# Patient Record
(demographics unavailable — no encounter records)

---

## 2024-10-10 NOTE — HISTORY OF PRESENT ILLNESS
[Patient reported bone density results were normal] : Patient reported bone density results were normal [postmenopausal] : postmenopausal [N] : Patient is not sexually active [Y] : Positive pregnancy history [Mammogramdate] : 11/2023 [TextBox_19] : br2 [BoneDensityDate] : 2023 [ColonoscopyDate] : 2023 [LMPDate] :  [PGxTotal] : 1 [[facility]] : 1 [PGxPremature] : 1 [[facility]] : [facility] [Menarche Age: ____] : age at menarche was [unfilled] [FreeTextEntry1] :  [No] : Patient does not have concerns regarding sex [Previously active] : previously active

## 2024-10-10 NOTE — PHYSICAL EXAM
[Chaperone Present] : A chaperone was present in the examining room during all aspects of the physical examination [FreeTextEntry2] : Esperanza Hardy MA

## 2024-10-23 NOTE — CHIEF COMPLAINT
[FreeTextEntry1] : [facility] [place] [address] [place] [address]

## 2024-10-23 NOTE — PHYSICAL EXAM
[Chaperone Present] : A chaperone was present in the examining room during all aspects of the physical examination [28192] : A chaperone was present during the pelvic exam. [FreeTextEntry2] : JAIR Sheehan [Normal] : Bimanual Exam: Normal [de-identified] : enlarged uterus, possible adenomyosis

## 2024-10-23 NOTE — PLAN
[TextEntry] : CHANO CHRISTINE BSO SLND cysto @ /[facility]/[place] (not [place]) Consent signed in office Acknowledgement of hysterectomy signed Slide release signed Bowel prep/ERP/NPO after midnight discussed  Medical clearance and PST with CBC, CMP, HgA1c, PTT/PT/INR, UA, T&S

## 2024-10-23 NOTE — HISTORY OF PRESENT ILLNESS
[FreeTextEntry1] : 72 yo  via C/S LMP  referred by Dr Altman for endometrial cancer.  Patient presented 24 with c/o PMB which started 2 weeks prior. EMB was performed whch resulted as benign squamous epithelium, mucus and neutrophils but was a scant sample.  US 10/10/24 with AV uterus, 6/5 x 3.47 x 2.87 cm, endometrium heterogeneous and thickened to 5.09 mm, with intracavitary fluid. Possible cervical polyp seen measuring 1.08 x 0.45 x 0.88 cm. and decision to procedure with repeat biopsy was made.  Repeat EMB  10/10/24 with endometrioid adenocarcinoma, FIGO grade 1, small, detached fragments. P53 wild type, P16 patchy positive, ER diffusely positive. MMR intact.   HM:  Pap Smear:  WNL as per pt Mammo: Dec 2023, WNL Bone Denisity: , WNL Colonoscopy: , no polyps found, pt has had diverticulitis   Has had all vaccines, no longer takes the covid booster

## 2024-10-23 NOTE — DISCUSSION/SUMMARY
[FreeTextEntry1] : Managment of endometrial cancer was discussed including RA TLH BSO SLND cystoscopy. I advised patient she may need a larger abdominal incision to remove uterus intact as it may not be able to be extracted vaginally. I discussed at length with the patient the nature, purpose, risks, benefits, and alternatives of Robot assisted total laparoscopic hysterectomy with bilateral salpingo-oophorectomy,sentinel lymph node mapping. The patient understands the risks to include, but not be limited to, bowel injury, bleeding (with the possible need for transfusion), bladder or ureteral injury, infections, deep venous thrombosis, and jaja-operative death. The patient also understands that her surgery may not be able to be performed robotically and that she may need a laparotomy.  She also understands the limitations of robotic surgery and the possibility of missing a surgical complication with need for subsequent re-exploration.  She agrees to proceed.  She asked numerous questions which were answered to her satisfaction.  She understands the need for a pre-operative bowel preparation and agrees to comply with our instructions.  She also understands the rationale for a cystoscopy at the completion of the procedure and the potential risks of cystoscopy.

## 2024-10-23 NOTE — END OF VISIT
[FreeTextEntry3] : I, Dr. Rufina French, personally performed the evaluation and management of (E/M) services for this new patient. That E/M includes conducting the initial examination, assessing all conditions, and establishing the plan of care. Today, Lore Patterson PA-C, was here to observe my evaluation and management services for this patient to be followed going forward.

## 2024-11-18 NOTE — END OF VISIT
[FreeTextEntry2] : Samara Alvarez MA was present the entire duration of the patient interaction and gynecological exam. [FreeTextEntry3] : RTC in 4 weeks for cuff check

## 2024-11-18 NOTE — END OF VISIT
[FreeTextEntry3] : RTC in 4 weeks for cuff check [FreeTextEntry2] : Samara Alvarez MA was present the entire duration of the patient interaction and gynecological exam.

## 2024-11-18 NOTE — ASSESSMENT
[FreeTextEntry1] : Pt is a 72 yo with Stage Ia endometrioid adenocarcnoma, grade 1, <50% invasion (1/7 mm), no LVI, lower uterine segment involvement, but no cervical involvement, negative sentinel lymph nodes s/p TRH, BSO, SLND, cysto on 11/4/24. Recovering well.

## 2024-11-18 NOTE — REASON FOR VISIT
[Post Op] : post op visit [de-identified] : 11/4/24 [de-identified] : RA TLH BSO SLND, Uterosacral ligament fixation, cystoscopy  [de-identified] : Patient has recovered well from her surgery, Denies any SOB, abnormal pain or VB. Bowel and bladder function are wnl. Patient states she feels well from a gynecological stand point.

## 2024-11-18 NOTE — ASSESSMENT
[FreeTextEntry1] : Pt is a 74 yo with Stage Ia endometrioid adenocarcnoma, grade 1, <50% invasion (1/7 mm), no LVI, lower uterine segment involvement, but no cervical involvement, negative sentinel lymph nodes s/p TRH, BSO, SLND, cysto on 11/4/24. Recovering well.

## 2024-11-18 NOTE — DISCUSSION/SUMMARY
[Clean] : was clean [Dry] : was dry [Intact] : was intact [Erythema] : was not erythematous [Ecchymosis] : was not ecchymotic [Seroma] : had no seroma [None] : had no drainage [Normal Skin] : normal appearance [Firm] : soft [Tender] : nontender [Abnormal Bowel Sounds] : normal bowel sounds [Rebound] : no rebound tenderness [Guarding] : no guarding [Incisional Hernia] : no incisional hernia [Mass] : no palpable mass [Doing Well] : is doing well [Excellent Pain Control] : has excellent pain control [No Sign of Infection] : is showing no signs of infection [FreeTextEntry1] : Pertinent Findings: Uterus sounded to 6 cm, normal bilateral fallopian tubes and ovaries. Bilateral obturator sentinel lymph nodes. No para-aortic sentinel lymph nodes. No concern for metastatic disease or abnormal lymph nodes. Cystoscopy: normal bilateral ureteral jets, no injury to the bladder and  no lesions.   Final Diagnosis 1.  Uterus, cervix, bilateral fallopian tubes and ovaries; total hysterectomy with bilateral salpingo-oophorectomy: -   Endometrioid adenocarcinoma, FIGO grade 1, see synoptic report -   Minimal superficial invasion,  less than 50% -   Leiomyoma -   Cervix with nabothian cyst -   Tumor involves lower uterine segment -   Cervical stroma is not involved by the tumor -   Bilateral parametrium, unremarkable -   Bilateral fallopian tubes with paratubal cyst -   Bilateral ovaries with inclusion cyst   2.  Left obturator sentinel lymph node, excision: -1 lymph node negative for metastatic carcinoma on H and E exam and AE1/AE3 immunostain (0/1)  3.  Right obturator sentinel lymph node, excision: -   1 lymph node negative for metastatic carcinoma on H and E exam and AE1/AE3 immunostain (0/1)  4.  Epiploic biopsy: -   Benign fibromembranous tissue with focal fibrosis, calcification, chronic inflammatory infiltrate  5.  Left uterosacral biopsy: -   Fibrotic nodule with calcification

## 2024-11-18 NOTE — REASON FOR VISIT
[Post Op] : post op visit [de-identified] : 11/4/24 [de-identified] : RA TLH BSO SLND, Uterosacral ligament fixation, cystoscopy  [de-identified] : Patient has recovered well from her surgery, Denies any SOB, abnormal pain or VB. Bowel and bladder function are wnl. Patient states she feels well from a gynecological stand point.

## 2024-12-09 NOTE — REASON FOR VISIT
[Post Op] : post op visit [Other ___] : [unfilled] [de-identified] : 11/4/24 [de-identified] :  RA TLH BSO SLND, Uterosacral ligament fixation, cystoscopy [de-identified] : Pt presents for second post-op visit. Reports recovering well, no vaginal bleeding or discharge, no pain, no fevers/chils. Depressed mood, less energy, no suicidal ideation.

## 2024-12-09 NOTE — ASSESSMENT
[FreeTextEntry1] : Pt is a 72 yo with Stage Ia endometrioid adenocarcnoma, grade 1, <50% invasion (1/7 mm), no LVI, lower uterine segment involvement, but no cervical involvement, negative sentinel lymph nodes s/p TRH, BSO, SLND, cysto on 11/4/24. Vaginal cuff well healed. Start surveillance in 4-6 months.   She feels she may be depressed and not usual for her. Referal for therapy provided.

## 2024-12-09 NOTE — DISCUSSION/SUMMARY
[Clean] : was clean [Dry] : was dry [Intact] : was intact [None] : had no drainage [Normal Skin] : normal appearance [Doing Well] : is doing well [Excellent Pain Control] : has excellent pain control [No Sign of Infection] : is showing no signs of infection [Erythema] : was not erythematous [Ecchymosis] : was not ecchymotic [Seroma] : had no seroma [Firm] : soft [Tender] : nontender [Abnormal Bowel Sounds] : normal bowel sounds [Rebound] : no rebound tenderness [Guarding] : no guarding [Incisional Hernia] : no incisional hernia [Mass] : no palpable mass [External Genitalia Abnormal] : normal external genitalia [Vaginal Exam Abnormal] : normal vaginal exam [de-identified] : cuff well healed [FreeTextEntry1] : Pertinent Findings: Uterus sounded to 6 cm, normal bilateral fallopian tubes and ovaries. Bilateral obturator sentinel lymph nodes. No para-aortic sentinel lymph nodes. No concern for metastatic disease or abnormal lymph nodes. Cystoscopy: normal bilateral ureteral jets, no injury to the bladder and no lesions.  Final Diagnosis 1. Uterus, cervix, bilateral fallopian tubes and ovaries; total hysterectomy with bilateral salpingo-oophorectomy: - Endometrioid adenocarcinoma, FIGO grade 1, see synoptic report - Minimal superficial invasion, less than 50% - Leiomyoma - Cervix with nabothian cyst - Tumor involves lower uterine segment - Cervical stroma is not involved by the tumor - Bilateral parametrium, unremarkable - Bilateral fallopian tubes with paratubal cyst - Bilateral ovaries with inclusion cyst   2. Left obturator sentinel lymph node, excision: -1 lymph node negative for metastatic carcinoma on H and E exam and AE1/AE3 immunostain (0/1)  3. Right obturator sentinel lymph node, excision: - 1 lymph node negative for metastatic carcinoma on H and E exam and AE1/AE3 immunostain (0/1)  4. Epiploic biopsy: - Benign fibromembranous tissue with focal fibrosis, calcification, chronic inflammatory infiltrate  5. Left uterosacral biopsy: - Fibrotic nodule with calcification.

## 2024-12-09 NOTE — END OF VISIT
[FreeTextEntry3] : RTC in 4-6 months  [FreeTextEntry2] : July Snow MA was present the entire duration of the patient interaction and gynecological exam.

## 2024-12-09 NOTE — DISCUSSION/SUMMARY
[Clean] : was clean [Dry] : was dry [Intact] : was intact [None] : had no drainage [Normal Skin] : normal appearance [Doing Well] : is doing well [Excellent Pain Control] : has excellent pain control [No Sign of Infection] : is showing no signs of infection [Erythema] : was not erythematous [Ecchymosis] : was not ecchymotic [Seroma] : had no seroma [Firm] : soft [Tender] : nontender [Abnormal Bowel Sounds] : normal bowel sounds [Rebound] : no rebound tenderness [Guarding] : no guarding [Incisional Hernia] : no incisional hernia [Mass] : no palpable mass [External Genitalia Abnormal] : normal external genitalia [Vaginal Exam Abnormal] : normal vaginal exam [de-identified] : cuff well healed [FreeTextEntry1] : Pertinent Findings: Uterus sounded to 6 cm, normal bilateral fallopian tubes and ovaries. Bilateral obturator sentinel lymph nodes. No para-aortic sentinel lymph nodes. No concern for metastatic disease or abnormal lymph nodes. Cystoscopy: normal bilateral ureteral jets, no injury to the bladder and no lesions.  Final Diagnosis 1. Uterus, cervix, bilateral fallopian tubes and ovaries; total hysterectomy with bilateral salpingo-oophorectomy: - Endometrioid adenocarcinoma, FIGO grade 1, see synoptic report - Minimal superficial invasion, less than 50% - Leiomyoma - Cervix with nabothian cyst - Tumor involves lower uterine segment - Cervical stroma is not involved by the tumor - Bilateral parametrium, unremarkable - Bilateral fallopian tubes with paratubal cyst - Bilateral ovaries with inclusion cyst   2. Left obturator sentinel lymph node, excision: -1 lymph node negative for metastatic carcinoma on H and E exam and AE1/AE3 immunostain (0/1)  3. Right obturator sentinel lymph node, excision: - 1 lymph node negative for metastatic carcinoma on H and E exam and AE1/AE3 immunostain (0/1)  4. Epiploic biopsy: - Benign fibromembranous tissue with focal fibrosis, calcification, chronic inflammatory infiltrate  5. Left uterosacral biopsy: - Fibrotic nodule with calcification.

## 2024-12-09 NOTE — ASSESSMENT
[FreeTextEntry1] : Pt is a 74 yo with Stage Ia endometrioid adenocarcnoma, grade 1, <50% invasion (1/7 mm), no LVI, lower uterine segment involvement, but no cervical involvement, negative sentinel lymph nodes s/p TRH, BSO, SLND, cysto on 11/4/24. Vaginal cuff well healed. Start surveillance in 4-6 months.   She feels she may be depressed and not usual for her. Referal for therapy provided.

## 2024-12-09 NOTE — REASON FOR VISIT
[Post Op] : post op visit [Other ___] : [unfilled] [de-identified] : 11/4/24 [de-identified] :  RA TLH BSO SLND, Uterosacral ligament fixation, cystoscopy [de-identified] : Pt presents for second post-op visit. Reports recovering well, no vaginal bleeding or discharge, no pain, no fevers/chils. Depressed mood, less energy, no suicidal ideation.

## 2025-01-07 NOTE — ASSESSMENT
[FreeTextEntry1] : 72yo female status post RA TLH BSO SLND, Uterosacral ligament fixation, cystoscopy on 11/4/24 for Stage IA endometrioid adenocarcinoma, grade 1, <50% invasion (1/7 mm), no LVI, lower uterine segment involvement, but no cervical involvement, negative sentinel lymph nodes with no adjuvant therapy.   Pt presents today with intermittent vaginal pains and persistent vaginal spotting. On exam, vaginal cuff appears intact, however, limited exam due to body habitus and discomfort. Vaginal cuff tender on left side only with speculum. No pain with manual exam. No signs of bleeding or mass. Will order CT abd/pelvis STAT for further evaluation.   Pt is traveling to [place] tomorrow late am. Will call pt with results.

## 2025-01-07 NOTE — REASON FOR VISIT
[FreeTextEntry1] : [facility] [phone] at [address] [place] [address]

## 2025-01-07 NOTE — PHYSICAL EXAM
[Chaperone Present] : A chaperone was present in the examining room during all aspects of the physical examination [68593] : A chaperone was present during the pelvic exam. [Absent] : Adnexa(ae): Absent [Normal] : Bimanual Exam: Normal [Fully active, able to carry on all pre-disease performance without restriction] : Status 0 - Fully active, able to carry on all pre-disease performance without restriction [FreeTextEntry2] :  Samara Colon MA [de-identified] : limited exam due to patient discomfort, tender vaginal cuff on left side only with speculum-not with manual exam. No blood noted.

## 2025-01-07 NOTE — HISTORY OF PRESENT ILLNESS
[FreeTextEntry1] : 74yo female status post RA TLH BSO SLND, Uterosacral ligament fixation, cystoscopy on 11/4/24 for Stage IA endometrioid adenocarcinoma, grade 1, <50% invasion (1/7 mm), no LVI, lower uterine segment involvement, but no cervical involvement, negative sentinel lymph nodes with no adjuvant therapy.   Pt presents today with intermittent vaginal pains and persistent vaginal spotting. States she had intercourse for the first time at her 6 week melisa and felt immediate pain. She noticed light spotting approx 3-4 days after and since then has intermittent staining and vaginal pains worse during the day and improved at rest. Denies pelvic/abdominal pains, n/v or fever.   Pt is traveling to [place] tomorrow late am and wants to make sure everything is okay.

## 2025-01-07 NOTE — REASON FOR VISIT
[FreeTextEntry1] : [facility] [phone] at [address] [place] [address]

## 2025-01-07 NOTE — ASSESSMENT
[FreeTextEntry1] : 74yo female status post RA TLH BSO SLND, Uterosacral ligament fixation, cystoscopy on 11/4/24 for Stage IA endometrioid adenocarcinoma, grade 1, <50% invasion (1/7 mm), no LVI, lower uterine segment involvement, but no cervical involvement, negative sentinel lymph nodes with no adjuvant therapy.   Pt presents today with intermittent vaginal pains and persistent vaginal spotting. On exam, vaginal cuff appears intact, however, limited exam due to body habitus and discomfort. Vaginal cuff tender on left side only with speculum. No pain with manual exam. No signs of bleeding or mass. Will order CT abd/pelvis STAT for further evaluation.   Pt is traveling to [place] tomorrow late am. Will call pt with results.

## 2025-01-07 NOTE — PHYSICAL EXAM
[Chaperone Present] : A chaperone was present in the examining room during all aspects of the physical examination [[id number]] : A chaperone was present during the pelvic exam. [Absent] : Adnexa(ae): Absent [Normal] : Bimanual Exam: Normal [Fully active, able to carry on all pre-disease performance without restriction] : Status 0 - Fully active, able to carry on all pre-disease performance without restriction [FreeTextEntry2] :  Samara Colon MA [de-identified] : limited exam due to patient discomfort, tender vaginal cuff on left side only with speculum-not with manual exam. No blood noted.

## 2025-01-07 NOTE — PLAN
[TextEntry] : CT abdomen/pelvis w/ IV contrast STAT-r/o pelvic abscess Await vaginal culture   ADDENDUM: Spoke to radiologist and reviewed CT. Spoke to pt and discussed findings of non- specific cystic structure about 2cm along the left external iliac vessels likely lymphocele or seroma. No suspicion for abscess. Cuff intact as per radiologist.   Recommended abstaining from intercourse until her spotting and pain resolves. Rec RTO when she returns from her trip for reevaluation. Pt was very appreciative.

## 2025-01-07 NOTE — REVIEW OF SYSTEMS
[Negative] : Musculoskeletal [Abn Vag Bleeding] : abnormal vaginal bleeding [FreeTextEntry4] : vaginal pain & staining

## 2025-01-07 NOTE — HISTORY OF PRESENT ILLNESS
[FreeTextEntry1] : 72yo female status post RA TLH BSO SLND, Uterosacral ligament fixation, cystoscopy on 11/4/24 for Stage IA endometrioid adenocarcinoma, grade 1, <50% invasion (1/7 mm), no LVI, lower uterine segment involvement, but no cervical involvement, negative sentinel lymph nodes with no adjuvant therapy.   Pt presents today with intermittent vaginal pains and persistent vaginal spotting. States she had intercourse for the first time at her 6 week melisa and felt immediate pain. She noticed light spotting approx 3-4 days after and since then has intermittent staining and vaginal pains worse during the day and improved at rest. Denies pelvic/abdominal pains, n/v or fever.   Pt is traveling to [place] tomorrow late am and wants to make sure everything is okay.

## 2025-06-04 NOTE — END OF VISIT
[FreeTextEntry3] : RTC in 6 months for surveillance visit [FreeTextEntry2] : Uzma Thompson MA was present the entire duration of the patient interaction and gynecological exam.

## 2025-06-04 NOTE — REASON FOR VISIT
[FreeTextEntry1] : [facility] [place]. [phone] [address] [place] [address]

## 2025-06-04 NOTE — ASSESSMENT
[FreeTextEntry1] : Pt is a 75 y/o with Stage Ia endometrioid adenocarcinoma, grade 1, <50% invasion (1/7 mm), no LVI, lower uterine segment involvement, but no cervical involvement, negative sentinel lymph nodes s/p TRH, BSO, SLND, cysto on 11/4/24.   No evidence of disease, no concerns on physical exam or history. No imaging indicated. Continue surveillance every 6 months with pelvic exam.

## 2025-06-04 NOTE — ASSESSMENT
[FreeTextEntry1] : Pt is a 73 y/o with Stage Ia endometrioid adenocarcinoma, grade 1, <50% invasion (1/7 mm), no LVI, lower uterine segment involvement, but no cervical involvement, negative sentinel lymph nodes s/p TRH, BSO, SLND, cysto on 11/4/24.   No evidence of disease, no concerns on physical exam or history. No imaging indicated. Continue surveillance every 6 months with pelvic exam.

## 2025-06-04 NOTE — REASON FOR VISIT
[FreeTextEntry1] : [facility] [place]. [phone] [address] [place] [address]

## 2025-06-04 NOTE — HISTORY OF PRESENT ILLNESS
[FreeTextEntry1] : Pt is a 73 y/o with Stage Ia endometrioid adenocarcinoma, grade 1, <50% invasion (1/7 mm), no LVI, lower uterine segment involvement, but no cervical involvement, negative sentinel lymph nodes s/p TRH, BSO, SLND, cysto on 11/4/24. She presents to the office today to start interval surveillance. She is feeling well from a gynecological standpoint.   Patient was seen post operatively on 12/9/24 and had reported feeling depressed and not her usual self. A referral for therapy was provided to the patient.   Patient presented on 1/7/25 with PA and had reported intermittent vaginal pains and persistent vaginal spotting. On exam, vaginal cuff appeared intact, however, limited exam due to body habitus and discomfort. Vaginal cuff tender on left side only with speculum. No pain with manual exam. No signs of bleeding or mass. Ordered CT abd/pelvis STAT for further evaluation.  PA spoke to radiologist and reviewed CT. She then spoke to pt and discussed findings of non- specific cystic structure about 2cm along the left external iliac vessel's likely lymphocele or seroma. No suspicion for abscess. Cuff intact as per radiologist.  Interval History: She reports having a wonderful trip around [place] with favorite being the [facility]. She reports no more pain with intercourse and her bowel function has improved since surgery and removal of the scar tissue. She is thankful. No new gynecologic issues. No bladder incontinence.   Health Maintenance: Mammogram: 11/22 in chart, patient reports uptodate Colonoscopy: 11/2024 - 10 years  DEXA: n/a Vaccines: n/a
[FreeTextEntry1] : Pt is a 75 y/o with Stage Ia endometrioid adenocarcinoma, grade 1, <50% invasion (1/7 mm), no LVI, lower uterine segment involvement, but no cervical involvement, negative sentinel lymph nodes s/p TRH, BSO, SLND, cysto on 11/4/24. She presents to the office today to start interval surveillance. She is feeling well from a gynecological standpoint.   Patient was seen post operatively on 12/9/24 and had reported feeling depressed and not her usual self. A referral for therapy was provided to the patient.   Patient presented on 1/7/25 with PA and had reported intermittent vaginal pains and persistent vaginal spotting. On exam, vaginal cuff appeared intact, however, limited exam due to body habitus and discomfort. Vaginal cuff tender on left side only with speculum. No pain with manual exam. No signs of bleeding or mass. Ordered CT abd/pelvis STAT for further evaluation.  PA spoke to radiologist and reviewed CT. She then spoke to pt and discussed findings of non- specific cystic structure about 2cm along the left external iliac vessel's likely lymphocele or seroma. No suspicion for abscess. Cuff intact as per radiologist.  Interval History: She reports having a wonderful trip around [place] with favorite being the hospitals. She reports no more pain with intercourse and her bowel function has improved since surgery and removal of the scar tissue. She is thankful. No new gynecologic issues. No bladder incontinence.   Health Maintenance: Mammogram: 11/22 in chart, patient reports uptodate Colonoscopy: 11/2024 - 10 years  DEXA: n/a Vaccines: n/a
stated

## 2025-06-04 NOTE — PHYSICAL EXAM
[Chaperoned Physical Exam] : A chaperone was present in the examining room during all aspects of the physical examination. [MA] : MA [FreeTextEntry2] : Uzma Thompson MA was present the entire duration of the patient interaction and gynecological exam. [Absent] : Adnexa(ae): Absent [Normal] : Anus and perineum: Normal sphincter tone, no masses, no prolapse. [de-identified] : soft, non-tender, no nodularity on incisoins  [de-identified] : no lesions or discharge [de-identified] : no nodularity or masses [Restricted in physically strenuous activity but ambulatory and able to carry out work of a light or sedentary nature] : Status 1- Restricted in physically strenuous activity but ambulatory and able to carry out work of a light or sedentary nature, e.g., light house work, office work

## 2025-06-04 NOTE — PHYSICAL EXAM
[Chaperoned Physical Exam] : A chaperone was present in the examining room during all aspects of the physical examination. [MA] : MA [FreeTextEntry2] : Uzma Thompson MA was present the entire duration of the patient interaction and gynecological exam. [Absent] : Adnexa(ae): Absent [Normal] : Anus and perineum: Normal sphincter tone, no masses, no prolapse. [de-identified] : soft, non-tender, no nodularity on incisoins  [de-identified] : no lesions or discharge [de-identified] : no nodularity or masses [Restricted in physically strenuous activity but ambulatory and able to carry out work of a light or sedentary nature] : Status 1- Restricted in physically strenuous activity but ambulatory and able to carry out work of a light or sedentary nature, e.g., light house work, office work